# Patient Record
Sex: MALE | Race: WHITE | NOT HISPANIC OR LATINO | URBAN - METROPOLITAN AREA
[De-identification: names, ages, dates, MRNs, and addresses within clinical notes are randomized per-mention and may not be internally consistent; named-entity substitution may affect disease eponyms.]

---

## 2024-10-31 ENCOUNTER — OFFICE VISIT (OUTPATIENT)
Dept: OPTOMETRY | Facility: CLINIC | Age: 69
End: 2024-10-31
Payer: COMMERCIAL

## 2024-10-31 DIAGNOSIS — H35.3122 INTERMEDIATE STAGE NONEXUDATIVE AGE-RELATED MACULAR DEGENERATION OF LEFT EYE: ICD-10-CM

## 2024-10-31 DIAGNOSIS — H43.811 POSTERIOR VITREOUS DETACHMENT OF RIGHT EYE: ICD-10-CM

## 2024-10-31 DIAGNOSIS — H35.3210 EXUDATIVE AGE-RELATED MACULAR DEGENERATION OF RIGHT EYE, UNSPECIFIED STAGE: Primary | ICD-10-CM

## 2024-10-31 DIAGNOSIS — H25.13 NUCLEAR SCLEROSIS OF BOTH EYES: ICD-10-CM

## 2024-10-31 PROBLEM — I10 HYPERTENSION: Status: ACTIVE | Noted: 2023-10-06

## 2024-10-31 PROCEDURE — 99999 PR PBB SHADOW E&M-NEW PATIENT-LVL III: CPT | Mod: PBBFAC,,, | Performed by: OPTOMETRIST

## 2024-10-31 PROCEDURE — 3288F FALL RISK ASSESSMENT DOCD: CPT | Mod: CPTII,S$GLB,, | Performed by: OPTOMETRIST

## 2024-10-31 PROCEDURE — 99204 OFFICE O/P NEW MOD 45 MIN: CPT | Mod: S$GLB,,, | Performed by: OPTOMETRIST

## 2024-10-31 PROCEDURE — 4010F ACE/ARB THERAPY RXD/TAKEN: CPT | Mod: CPTII,S$GLB,, | Performed by: OPTOMETRIST

## 2024-10-31 PROCEDURE — 1126F AMNT PAIN NOTED NONE PRSNT: CPT | Mod: CPTII,S$GLB,, | Performed by: OPTOMETRIST

## 2024-10-31 PROCEDURE — 1101F PT FALLS ASSESS-DOCD LE1/YR: CPT | Mod: CPTII,S$GLB,, | Performed by: OPTOMETRIST

## 2024-10-31 PROCEDURE — 1159F MED LIST DOCD IN RCRD: CPT | Mod: CPTII,S$GLB,, | Performed by: OPTOMETRIST

## 2024-10-31 PROCEDURE — 92134 CPTRZ OPH DX IMG PST SGM RTA: CPT | Mod: S$GLB,,, | Performed by: OPTOMETRIST

## 2024-10-31 PROCEDURE — 1160F RVW MEDS BY RX/DR IN RCRD: CPT | Mod: CPTII,S$GLB,, | Performed by: OPTOMETRIST

## 2024-10-31 RX ORDER — ATORVASTATIN CALCIUM 40 MG/1
40 TABLET, FILM COATED ORAL
COMMUNITY

## 2024-10-31 RX ORDER — LOSARTAN POTASSIUM 50 MG/1
50 TABLET ORAL
COMMUNITY

## 2024-10-31 RX ORDER — LOSARTAN POTASSIUM 100 %
POWDER (GRAM) MISCELLANEOUS
COMMUNITY

## 2024-12-18 ENCOUNTER — OFFICE VISIT (OUTPATIENT)
Dept: OPHTHALMOLOGY | Facility: CLINIC | Age: 69
End: 2024-12-18
Attending: OPHTHALMOLOGY
Payer: COMMERCIAL

## 2024-12-18 DIAGNOSIS — H35.3211 EXUDATIVE AGE-RELATED MACULAR DEGENERATION, RIGHT EYE, WITH ACTIVE CHOROIDAL NEOVASCULARIZATION: Primary | ICD-10-CM

## 2024-12-18 DIAGNOSIS — H35.3122 NONEXUDATIVE AGE-RELATED MACULAR DEGENERATION, LEFT EYE, INTERMEDIATE DRY STAGE: ICD-10-CM

## 2024-12-18 DIAGNOSIS — H25.13 NUCLEAR SCLEROSIS OF BOTH EYES: ICD-10-CM

## 2024-12-18 PROCEDURE — 99999 PR PBB SHADOW E&M-EST. PATIENT-LVL II: CPT | Mod: PBBFAC,,, | Performed by: OPHTHALMOLOGY

## 2024-12-18 RX ADMIN — Medication 1.25 MG: at 02:12

## 2025-01-25 ENCOUNTER — PROCEDURE VISIT (OUTPATIENT)
Dept: OPHTHALMOLOGY | Facility: CLINIC | Age: 70
End: 2025-01-25
Attending: OPHTHALMOLOGY
Payer: COMMERCIAL

## 2025-01-25 DIAGNOSIS — H35.3211 EXUDATIVE AGE-RELATED MACULAR DEGENERATION, RIGHT EYE, WITH ACTIVE CHOROIDAL NEOVASCULARIZATION: Primary | ICD-10-CM

## 2025-01-25 PROCEDURE — 67028 INJECTION EYE DRUG: CPT | Mod: RT,S$GLB,, | Performed by: OPHTHALMOLOGY

## 2025-01-25 PROCEDURE — 99499 UNLISTED E&M SERVICE: CPT | Mod: S$GLB,,, | Performed by: OPHTHALMOLOGY

## 2025-01-25 PROCEDURE — 92134 CPTRZ OPH DX IMG PST SGM RTA: CPT | Mod: S$GLB,,, | Performed by: OPHTHALMOLOGY

## 2025-01-25 RX ADMIN — Medication 1.25 MG: at 11:01

## 2025-01-25 NOTE — PROGRESS NOTES
"Subjective:       Patient ID: Aakash Bowles is a 69 y.o. male      Chief Complaint   Patient presents with    Injections     History of Present Illness  HPI    1m OCT/[facility] OD only    Eye meds: None    69 year old male states vision in the right eye seems improved since last   injection. Denies flashes, floaters or diplopia. Denies ocular pain. Pt is   requesting "right eye be patched up after injection with gauze"    Last edited by Lg Durand MD on 1/25/2025 11:21 AM.        Imaging:    See report    Assessment/Plan:     1. Exudative age-related macular degeneration, right eye, with active choroidal neovascularization (Primary)  Appears mostly fibrotic   Discussed guarded visual prognosis but can try an injection series to see if can help some with vision    Improved today  Will continue monthly Av until stops improving    Discussed pathology in detail.  Recommend Avastin OD today.  Discussed RBA inc endophthalmitis  Discussed injection protocol, TREX, and visual expectations    - Posterior Segment OCT Retina-Both eyes  - Prior authorization Order      Follow up in about 1 month (around 2/25/2025), or if symptoms worsen or fail to improve, for OCT and INJECTION ONLY (DILATE INJECTION EYE), Injection Right eye, Avastin.       Consider SC lido next time           "

## 2025-03-10 ENCOUNTER — CLINICAL SUPPORT (OUTPATIENT)
Dept: OPHTHALMOLOGY | Facility: CLINIC | Age: 70
End: 2025-03-10
Payer: COMMERCIAL

## 2025-03-10 ENCOUNTER — OFFICE VISIT (OUTPATIENT)
Dept: OPHTHALMOLOGY | Facility: CLINIC | Age: 70
End: 2025-03-10
Payer: COMMERCIAL

## 2025-03-10 DIAGNOSIS — H35.3211 EXUDATIVE AGE-RELATED MACULAR DEGENERATION, RIGHT EYE, WITH ACTIVE CHOROIDAL NEOVASCULARIZATION: Primary | ICD-10-CM

## 2025-03-10 DIAGNOSIS — H35.3122 NONEXUDATIVE AGE-RELATED MACULAR DEGENERATION, LEFT EYE, INTERMEDIATE DRY STAGE: ICD-10-CM

## 2025-03-10 DIAGNOSIS — H25.13 NUCLEAR SCLEROSIS OF BOTH EYES: ICD-10-CM

## 2025-03-10 PROCEDURE — 1101F PT FALLS ASSESS-DOCD LE1/YR: CPT | Mod: CPTII,S$GLB,, | Performed by: OPHTHALMOLOGY

## 2025-03-10 PROCEDURE — 99999 PR PBB SHADOW E&M-EST. PATIENT-LVL III: CPT | Mod: PBBFAC,,, | Performed by: OPHTHALMOLOGY

## 2025-03-10 PROCEDURE — 1160F RVW MEDS BY RX/DR IN RCRD: CPT | Mod: CPTII,S$GLB,, | Performed by: OPHTHALMOLOGY

## 2025-03-10 PROCEDURE — 3288F FALL RISK ASSESSMENT DOCD: CPT | Mod: CPTII,S$GLB,, | Performed by: OPHTHALMOLOGY

## 2025-03-10 PROCEDURE — 99213 OFFICE O/P EST LOW 20 MIN: CPT | Mod: S$GLB,,, | Performed by: OPHTHALMOLOGY

## 2025-03-10 PROCEDURE — G2211 COMPLEX E/M VISIT ADD ON: HCPCS | Mod: S$GLB,,, | Performed by: OPHTHALMOLOGY

## 2025-03-10 PROCEDURE — 1159F MED LIST DOCD IN RCRD: CPT | Mod: CPTII,S$GLB,, | Performed by: OPHTHALMOLOGY

## 2025-03-10 PROCEDURE — 4010F ACE/ARB THERAPY RXD/TAKEN: CPT | Mod: CPTII,S$GLB,, | Performed by: OPHTHALMOLOGY

## 2025-03-10 PROCEDURE — 92134 CPTRZ OPH DX IMG PST SGM RTA: CPT | Mod: S$GLB,,, | Performed by: OPHTHALMOLOGY

## 2025-03-10 PROCEDURE — 1126F AMNT PAIN NOTED NONE PRSNT: CPT | Mod: CPTII,S$GLB,, | Performed by: OPHTHALMOLOGY

## 2025-03-10 RX ORDER — METOPROLOL TARTRATE 25 MG/1
25 TABLET, FILM COATED ORAL 2 TIMES DAILY
COMMUNITY
Start: 2025-01-27

## 2025-03-14 NOTE — PROGRESS NOTES
Subjective:       Patient ID: Aakash Bowles is a 70 y.o. male      Chief Complaint   Patient presents with    Injections     History of Present Illness  HPI    1m OCT/[facility] Od only    Pt. States No issues with overall Vision. Va is stable at this time.   Denies Floaters and pain. Gets occasional FOL when pt lays down at night.   Happens usually once a wk.  No use of gtts.    Last edited by Lg Durand MD on 3/10/2025  5:10 PM.        Imaging:    See report    Assessment/Plan:     1. Exudative age-related macular degeneration, right eye, with active choroidal neovascularization (Primary)  Fluid improved with Avastin but has fibrotic SR scar.  Discussed continued Av vs obs  Pt feels there's been some small improvement but not sig changed visual function  Pt elects to observe for now, will watch vision and if worsens will consider if injections are worth the minimal improvement    - Posterior Segment OCT Retina-Both eyes  - Prior authorization Order    2. Nonexudative age-related macular degeneration, left eye, intermediate dry stage  Discussed Dry and Wet AMD in detail  Recommend AREDS 2 Vitamins- continue  Home [employer] Grid Testing  RTC immediately PRN any changes in vision    3. Nuclear sclerosis of both eyes  Not vis sig  Observe    Visit today included increased complexity associated with the care of the episodic problem dry and wet AMD, cataracts addressed and managing the longitudinal care of the patient due to the serious and/or complex managed problem(s) dry and wet AMD, cataracts.    No follow-ups on file.

## 2025-05-21 ENCOUNTER — CLINICAL SUPPORT (OUTPATIENT)
Dept: OPHTHALMOLOGY | Facility: CLINIC | Age: 70
End: 2025-05-21
Payer: COMMERCIAL

## 2025-05-21 ENCOUNTER — OFFICE VISIT (OUTPATIENT)
Dept: OPHTHALMOLOGY | Facility: CLINIC | Age: 70
End: 2025-05-21
Attending: OPHTHALMOLOGY
Payer: COMMERCIAL

## 2025-05-21 DIAGNOSIS — H35.3231 EXUDATIVE AGE-RELATED MACULAR DEGENERATION OF BOTH EYES WITH ACTIVE CHOROIDAL NEOVASCULARIZATION: Primary | ICD-10-CM

## 2025-05-21 PROCEDURE — 1159F MED LIST DOCD IN RCRD: CPT | Mod: CPTII,S$GLB,, | Performed by: OPHTHALMOLOGY

## 2025-05-21 PROCEDURE — 67028 INJECTION EYE DRUG: CPT | Mod: LT,S$GLB,, | Performed by: OPHTHALMOLOGY

## 2025-05-21 PROCEDURE — 1160F RVW MEDS BY RX/DR IN RCRD: CPT | Mod: CPTII,S$GLB,, | Performed by: OPHTHALMOLOGY

## 2025-05-21 PROCEDURE — 99214 OFFICE O/P EST MOD 30 MIN: CPT | Mod: 25,S$GLB,, | Performed by: OPHTHALMOLOGY

## 2025-05-21 PROCEDURE — 99999 PR PBB SHADOW E&M-EST. PATIENT-LVL II: CPT | Mod: PBBFAC,,, | Performed by: OPHTHALMOLOGY

## 2025-05-21 PROCEDURE — 3288F FALL RISK ASSESSMENT DOCD: CPT | Mod: CPTII,S$GLB,, | Performed by: OPHTHALMOLOGY

## 2025-05-21 PROCEDURE — 92134 CPTRZ OPH DX IMG PST SGM RTA: CPT | Mod: S$GLB,,, | Performed by: OPHTHALMOLOGY

## 2025-05-21 PROCEDURE — 4010F ACE/ARB THERAPY RXD/TAKEN: CPT | Mod: CPTII,S$GLB,, | Performed by: OPHTHALMOLOGY

## 2025-05-21 PROCEDURE — 1101F PT FALLS ASSESS-DOCD LE1/YR: CPT | Mod: CPTII,S$GLB,, | Performed by: OPHTHALMOLOGY

## 2025-05-21 PROCEDURE — 99999 PR PBB SHADOW E&M-EST. PATIENT-LVL I: CPT | Mod: PBBFAC,,,

## 2025-05-21 RX ADMIN — Medication 1.25 MG: at 11:05

## 2025-05-21 NOTE — PROGRESS NOTES
Subjective:       Patient ID: Aakash Bowles is a 70 y.o. male      Chief Complaint   Patient presents with    Follow-up     F/u AMD as instructed     History of Present Illness  HPI     Follow-up     Additional comments: F/u AMD as instructed           Comments    Vision seems about the same OU.      No f/f/pain OU    Meds:  AREDS 2          Last edited by Lg Durand MD on 5/21/2025  1:39 PM.        Imaging:    See report    Assessment/Plan:     1. Exudative age-related macular degeneration, both eyes, with active choroidal neovascularization (Primary)  Fluid improved with Avastin but has fibrotic SR scar.  Stable since last visit  Discussed continued Av vs obs  Pt feels there's been some small improvement but not sig changed visual function  Recommend to cont observe for now, will follow closely since now will be seen often for OS too    - Posterior Segment OCT Retina-Both eyes  - Prior authorization Order    New diagnosis OS today  Discussed pathology in detail.  Recommend Avastin OS today.  Discussed RBA inc endophthalmitis  Discussed injection protocol, TREX, and visual expectations      3. Nuclear sclerosis of both eyes  Not vis sig  Observe    Schedule refraction        Visit today included increased complexity associated with the care of the episodic problem dry and wet AMD, cataracts addressed and managing the longitudinal care of the patient due to the serious and/or complex managed problem(s) dry and wet AMD, cataracts.    Follow up in about 4 weeks (around 6/18/2025), or if symptoms worsen or fail to improve, for OCT and INJECTION ONLY (DILATE INJECTION EYE), Injection Left eye, Avastin.

## 2025-06-18 ENCOUNTER — CLINICAL SUPPORT (OUTPATIENT)
Dept: OPHTHALMOLOGY | Facility: CLINIC | Age: 70
End: 2025-06-18
Payer: COMMERCIAL

## 2025-06-18 ENCOUNTER — OFFICE VISIT (OUTPATIENT)
Dept: OPHTHALMOLOGY | Facility: CLINIC | Age: 70
End: 2025-06-18
Attending: OPHTHALMOLOGY
Payer: COMMERCIAL

## 2025-06-18 DIAGNOSIS — H35.3231 EXUDATIVE AGE-RELATED MACULAR DEGENERATION OF BOTH EYES WITH ACTIVE CHOROIDAL NEOVASCULARIZATION: Primary | ICD-10-CM

## 2025-06-18 PROCEDURE — 99999 PR PBB SHADOW E&M-EST. PATIENT-LVL I: CPT | Mod: PBBFAC,,,

## 2025-06-18 PROCEDURE — 67028 INJECTION EYE DRUG: CPT | Mod: LT,S$GLB,, | Performed by: OPHTHALMOLOGY

## 2025-06-18 PROCEDURE — 99999 PR PBB SHADOW E&M-EST. PATIENT-LVL II: CPT | Mod: PBBFAC,,, | Performed by: OPHTHALMOLOGY

## 2025-06-18 PROCEDURE — 99499 UNLISTED E&M SERVICE: CPT | Mod: S$GLB,,, | Performed by: OPHTHALMOLOGY

## 2025-06-18 PROCEDURE — 92134 CPTRZ OPH DX IMG PST SGM RTA: CPT | Mod: S$GLB,,, | Performed by: OPHTHALMOLOGY

## 2025-06-18 RX ADMIN — Medication 1.25 MG: at 03:06

## 2025-06-18 NOTE — PROGRESS NOTES
Subjective:       Patient ID: Aakash Bowles is a 70 y.o. male      Chief Complaint   Patient presents with    oct      avastin  os     armd         History of Present Illness  HPI    Avastin   os   No  gtts   Oct    SR  SCAR     Pvd  od   DLS 05/21/25  Last edited by Cary Escobar on 6/18/2025  2:12 PM.        Imaging:    See report    Assessment/Plan:     1. Exudative age-related macular degeneration, both eyes, with active choroidal neovascularization (Primary)  Ovserving OD    - Posterior Segment OCT Retina-Both eyes  - Prior authorization Order    New diagnosis OS 5/25  No fluid today 4 wks after Av #1  Rec Av OS today and TREX to 5 wks      Follow up in about 5 weeks (around 7/23/2025), or if symptoms worsen or fail to improve, for OCT and INJECTION ONLY (DILATE INJECTION EYE), Injection Left eye, Avastin.

## 2025-06-20 ENCOUNTER — CLINICAL SUPPORT (OUTPATIENT)
Dept: OPHTHALMOLOGY | Facility: CLINIC | Age: 70
End: 2025-06-20
Payer: COMMERCIAL

## 2025-06-20 DIAGNOSIS — H35.3211 EXUDATIVE AGE-RELATED MACULAR DEGENERATION, RIGHT EYE, WITH ACTIVE CHOROIDAL NEOVASCULARIZATION: ICD-10-CM

## 2025-06-20 DIAGNOSIS — H35.3231 EXUDATIVE AGE-RELATED MACULAR DEGENERATION OF BOTH EYES WITH ACTIVE CHOROIDAL NEOVASCULARIZATION: Primary | ICD-10-CM

## 2025-08-04 ENCOUNTER — CLINICAL SUPPORT (OUTPATIENT)
Dept: OPHTHALMOLOGY | Facility: CLINIC | Age: 70
End: 2025-08-04
Payer: COMMERCIAL

## 2025-08-04 ENCOUNTER — OFFICE VISIT (OUTPATIENT)
Dept: OPHTHALMOLOGY | Facility: CLINIC | Age: 70
End: 2025-08-04
Attending: OPHTHALMOLOGY
Payer: COMMERCIAL

## 2025-08-04 DIAGNOSIS — H35.3231 EXUDATIVE AGE-RELATED MACULAR DEGENERATION OF BOTH EYES WITH ACTIVE CHOROIDAL NEOVASCULARIZATION: Primary | ICD-10-CM

## 2025-08-04 PROCEDURE — 99999 PR PBB SHADOW E&M-EST. PATIENT-LVL III: CPT | Mod: PBBFAC,,, | Performed by: OPHTHALMOLOGY

## 2025-08-04 RX ADMIN — Medication 1.25 MG: at 11:08

## 2025-08-04 NOTE — PROGRESS NOTES
Subjective:       Patient ID: Aakash Bowles is a 70 y.o. male      Chief Complaint   Patient presents with    Injections     History of Present Illness  HPI    Pt is here for DFE/OCT regarding injections OS for exudative AMD    Pt states no eye pain or discomfort. No flashes or floaters. States that   he has a more prominent central blurry spot in OD. He notices it more than   he did last visit. No other complaints.  Last edited by Dorian Muñoz MA on 8/4/2025 10:02 AM.        Imaging:    See report    Assessment/Plan:     1. Exudative age-related macular degeneration, both eyes, with active choroidal neovascularization (Primary)  Observing OD    - Posterior Segment OCT Retina-Both eyes  - Prior authorization Order    New diagnosis OS 5/25  No fluid 4 wks after Av #1  Overdue for planned inj today, almost 7 wks.  Was aiming for 5 wks  Rec Av OS today and tighten interval to 4 wks  Since cannot extend Rx interval recommend trial of Vab.  Will send prior auth      Follow up in about 4 weeks (around 9/1/2025), or if symptoms worsen or fail to improve, for Dilated examination (DILATE OU), OCT Mac, Injection Left eye, Vabysmo.        Try SC lido next visit.  Pt had a lot of discomfort today

## 2025-08-15 ENCOUNTER — OFFICE VISIT (OUTPATIENT)
Dept: OPTOMETRY | Facility: CLINIC | Age: 70
End: 2025-08-15
Payer: COMMERCIAL

## 2025-08-15 DIAGNOSIS — H52.7 REFRACTIVE ERROR: Primary | ICD-10-CM

## 2025-08-15 PROCEDURE — 99999 PR PBB SHADOW E&M-EST. PATIENT-LVL II: CPT | Mod: PBBFAC,,, | Performed by: OPTOMETRIST

## 2025-09-02 ENCOUNTER — TELEPHONE (OUTPATIENT)
Dept: OPHTHALMOLOGY | Facility: CLINIC | Age: 70
End: 2025-09-02
Payer: COMMERCIAL